# Patient Record
Sex: MALE | Race: WHITE | Employment: FULL TIME | URBAN - METROPOLITAN AREA
[De-identification: names, ages, dates, MRNs, and addresses within clinical notes are randomized per-mention and may not be internally consistent; named-entity substitution may affect disease eponyms.]

---

## 2019-07-13 ENCOUNTER — HOSPITAL ENCOUNTER (OUTPATIENT)
Dept: CT IMAGING | Age: 49
Discharge: HOME | End: 2019-07-13
Attending: INTERNAL MEDICINE
Payer: COMMERCIAL

## 2019-07-13 DIAGNOSIS — J18.9 PNEUMONIA, UNSPECIFIED ORGANISM: ICD-10-CM

## 2019-07-13 PROCEDURE — 71250 CT THORAX DX C-: CPT

## 2020-06-03 ENCOUNTER — HOSPITAL ENCOUNTER (OUTPATIENT)
Dept: CT IMAGING | Age: 50
Discharge: HOME | End: 2020-06-03
Attending: INTERNAL MEDICINE
Payer: COMMERCIAL

## 2020-06-03 DIAGNOSIS — J18.9 PNEUMONIA DUE TO ORGANISM: ICD-10-CM

## 2020-06-03 PROCEDURE — 71250 CT THORAX DX C-: CPT

## 2020-08-03 ENCOUNTER — HOSPITAL ENCOUNTER (EMERGENCY)
Age: 50
Discharge: HOME | End: 2020-08-03
Attending: EMERGENCY MEDICINE
Payer: COMMERCIAL

## 2020-08-03 VITALS
TEMPERATURE: 98.3 F | HEART RATE: 71 BPM | HEIGHT: 71 IN | DIASTOLIC BLOOD PRESSURE: 75 MMHG | SYSTOLIC BLOOD PRESSURE: 117 MMHG | WEIGHT: 186.73 LBS | OXYGEN SATURATION: 98 % | BODY MASS INDEX: 26.14 KG/M2

## 2020-08-03 DIAGNOSIS — Z20.9 EXPOSURE TO BAT WITHOUT KNOWN BITE: Primary | ICD-10-CM

## 2020-08-03 PROCEDURE — 74011250636 HC RX REV CODE- 250/636: Performed by: EMERGENCY MEDICINE

## 2020-08-03 PROCEDURE — 99283 EMERGENCY DEPT VISIT LOW MDM: CPT

## 2020-08-03 PROCEDURE — 90375 RABIES IG IM/SC: CPT | Performed by: EMERGENCY MEDICINE

## 2020-08-03 PROCEDURE — 90471 IMMUNIZATION ADMIN: CPT

## 2020-08-03 PROCEDURE — 90675 RABIES VACCINE IM: CPT | Performed by: EMERGENCY MEDICINE

## 2020-08-03 RX ORDER — TAMSULOSIN HYDROCHLORIDE 0.4 MG/1
0.4 CAPSULE ORAL DAILY
COMMUNITY

## 2020-08-03 RX ADMIN — RABIES IMMUNE GLOBULIN (HUMAN) 1680 UNITS: 300 INJECTION, SOLUTION INFILTRATION; INTRAMUSCULAR at 19:00

## 2020-08-03 RX ADMIN — RABIES VACCINE 2.5 UNITS: KIT at 19:01

## 2020-08-03 NOTE — PROGRESS NOTES
Care Management -     CM notified of consult for follow-up rabies information. CM spoke with pt regarding follow-up. CM explained follow-up vaccines needed on Day 3- August 6th, Day 7- August 10th, and Day 14- August 17. CM discussed follow-up vaccine appointments can be made at [facility] and also encouraged pt to contact insurance provider for most cost-effective provider for pt. Pt would like to wait to contact insurance company prior to appointment. CM verified demographic information. Prescription to be copied and scanned into chart. Ask patient to call this CM by noon tomorrow if he changes his mind. Spoke with patient's nurse. Notified her of the above. Requested that prescription still be placed in chart. Faxed copy of rabies information sheet to RN Yolanda Brown at [facility] [place] ([phone]).      VANESSA Yates

## 2020-08-03 NOTE — ED PROVIDER NOTES
70-year-old gentleman presents after an encounter with a bat this evening. He was moving an umbrella outside due to upcoming weather when a bat fell out striking his hands. The patient is unsure whether or not he was bitten or scratched by the bat. He does note that he has several scratches and wounds to his hands which are likely pre-existing due to woodworking. The history is provided by the patient. Other   This is a new problem. Episode onset: Today. The problem occurs constantly. The problem has not changed since onset. Pertinent negatives include no chest pain, no abdominal pain, no headaches and no shortness of breath. Nothing aggravates the symptoms. Nothing relieves the symptoms. He has tried nothing for the symptoms. Past Medical History:   Diagnosis Date    Anxiety     Prostatitis        Past Surgical History:   Procedure Laterality Date    HX LITHOTRIPSY      HX LUMBAR LAMINECTOMY      HX RHINOPLASTY           History reviewed. No pertinent family history.     Social History     Socioeconomic History    Marital status:      Spouse name: Not on file    Number of children: Not on file    Years of education: Not on file    Highest education level: Not on file   Occupational History    Not on file   Social Needs    Financial resource strain: Not on file    Food insecurity     Worry: Not on file     Inability: Not on file    Transportation needs     Medical: Not on file     Non-medical: Not on file   Tobacco Use    Smoking status: Former Smoker    Smokeless tobacco: Never Used   Substance and Sexual Activity    Alcohol use: Never     Frequency: Never    Drug use: Not on file    Sexual activity: Not on file   Lifestyle    Physical activity     Days per week: Not on file     Minutes per session: Not on file    Stress: Not on file   Relationships    Social connections     Talks on phone: Not on file     Gets together: Not on file     Attends Judaism service: Not on file Active member of club or organization: Not on file     Attends meetings of clubs or organizations: Not on file     Relationship status: Not on file    Intimate partner violence     Fear of current or ex partner: Not on file     Emotionally abused: Not on file     Physically abused: Not on file     Forced sexual activity: Not on file   Other Topics Concern    Not on file   Social History Narrative    Not on file         ALLERGIES: Codeine    Review of Systems   Constitutional: Negative for fatigue and fever. HENT: Negative for sneezing and sore throat. Respiratory: Negative for cough and shortness of breath. Cardiovascular: Negative for chest pain and leg swelling. Gastrointestinal: Negative for abdominal pain, diarrhea, nausea and vomiting. Genitourinary: Negative for difficulty urinating and dysuria. Musculoskeletal: Negative for arthralgias and myalgias. Skin: Negative for color change and rash. Neurological: Negative for weakness and headaches. Psychiatric/Behavioral: Negative for agitation and behavioral problems. Vitals:    08/03/20 1804   BP: 117/75   Pulse: 71   Temp: 98.3 °F (36.8 °C)   SpO2: 98%   Weight: 84.7 kg (186 lb 11.7 oz)   Height: 5' 11\" (1.803 m)            Physical Exam  Vitals signs and nursing note reviewed. Constitutional:       General: He is not in acute distress. Appearance: He is well-developed. HENT:      Head: Normocephalic and atraumatic. Mouth/Throat:      Mouth: Mucous membranes are moist.      Pharynx: Oropharynx is clear. Eyes:      Extraocular Movements: Extraocular movements intact. Pupils: Pupils are equal, round, and reactive to light. Neck:      Musculoskeletal: Normal range of motion and neck supple. Cardiovascular:      Rate and Rhythm: Normal rate and regular rhythm. Pulses: Normal pulses. Heart sounds: No murmur. Pulmonary:      Effort: Pulmonary effort is normal.      Breath sounds: Normal breath sounds. Chest:      Chest wall: No mass or tenderness. Abdominal:      Palpations: Abdomen is soft. Tenderness: There is no abdominal tenderness. There is no guarding or rebound. Musculoskeletal:      Right lower leg: He exhibits no tenderness. No edema. Left lower leg: He exhibits no tenderness. No edema. Lymphadenopathy:      Cervical: No cervical adenopathy. Skin:     General: Skin is warm and dry. Neurological:      General: No focal deficit present. Mental Status: He is alert and oriented to person, place, and time. Psychiatric:         Mood and Affect: Mood normal.         Behavior: Behavior normal.          MDM  Number of Diagnoses or Management Options  Diagnosis management comments: 66-year-old gentleman presents after exposure to a bat with potential for a bite. He was unable to collect the animal, which he describes as acting strangely. Given high morbidity/mortality with rabies plan to treat with rabies series despite the exceedingly low prevalence of disease.          Procedures

## 2020-08-03 NOTE — ED TRIAGE NOTES
TRIAGE NOTE: Patient arrived from home with c/o bat bite. \"I was closing the umbrella and there was a bat that fell on me. Unsure if it bit me or not. I just want to get rabies vaccination if necessary. \"

## 2020-08-04 NOTE — PROGRESS NOTES
Care Management - Received call from patient. He has his vaccines arranged at [facility]. His insurance does contract with the pharmacy.   VANESSA Jenkins